# Patient Record
(demographics unavailable — no encounter records)

---

## 2025-03-12 NOTE — CONSULT LETTER
[Dear  ___] : Dear  [unfilled], [Consult Letter:] : I had the pleasure of evaluating your patient, [unfilled]. [Please see my note below.] : Please see my note below. [Consult Closing:] : Thank you very much for allowing me to participate in the care of this patient.  If you have any questions, please do not hesitate to contact me. [Sincerely,] : Sincerely, [FreeTextEntry3] : Ary Espinoza MD Attending Physician, Pediatric Gastroenterology and Nutrition [facility]  of Pediatrics [facility] at [address], [place] [address] [phone] fax: [phone]

## 2025-03-12 NOTE — CONSULT LETTER
[Dear  ___] : Dear  [unfilled], [Consult Letter:] : I had the pleasure of evaluating your patient, [unfilled]. [Please see my note below.] : Please see my note below. [Consult Closing:] : Thank you very much for allowing me to participate in the care of this patient.  If you have any questions, please do not hesitate to contact me. [Sincerely,] : Sincerely, [FreeTextEntry3] : Ary Espinoza MD Attending Physician, Pediatric Gastroenterology and Nutrition [facility]ju [facility]  of Pediatrics [facility] at [address], [place] [address] [phone] fax: [phone]

## 2025-03-12 NOTE — FAMILY HISTORY
[Noncontributory] : The patient's family history was noncontributory to the condition being treated. [Inflammatory Bowel Disease] : no inflammatory bowel disease [Celiac Disease] : no celiac disease [de-identified] : mother and father are s/p treatment many years ago for HP. 8yo brother has autism. 3 yo sister is healthy

## 2025-03-12 NOTE — ASSESSMENT
[Educated Patient & Family about Diagnosis] : educated the patient and family about the diagnosis [FreeTextEntry1] : Thriving 10 yo with ASD/anxiety with abdominal pain and positive Fecal HP. Both parents born in [facility] and s/p treatment for HP years ago. Exposure to multiple family members also from [facility]. HP gastritis on EGD 1/27/25. S/p Amox/Levo/PPI and without complaints of pain.  Plan: 1) Urease BT 2) Further management pending results

## 2025-03-12 NOTE — END OF VISIT
[Time Spent: ___ minutes] : I have spent [unfilled] minutes of time on the encounter which excludes teaching and separately reported services. [FreeTextEntry3] : I,  personally performed the evaluation and management (E/M) services for this patient who presents today. That E/M includes conducting the clinically appropriate interval history &/or exam, assessing all new/exacerbated conditions, and establishing a plan of care. Included in the visit timing was prep time with review of records and pertinent labs, progress notes, and consultations. Discussion time with family and patient re: illness, and management plan. As well as, post-visit completion of charting, consultation, and review on the day of the visit

## 2025-03-12 NOTE — HISTORY OF PRESENT ILLNESS
[de-identified] : Radha is a 10 yo with ASD/ anxiety referred by Dr. Arrieta for the evaluation and management of abdominal pain and positive fecal HP. She was first/last seen 12/11/24  The pain began a few months prior to her first visit. It was periumbilical, had initially occurred sporadically, but now occurred almost daily, and was non-radiating. It occurred at random, without any specific dietary precipitant noted. She had been avoiding "acidic foods". It had been relieved with time. It varies in intensity, but is usually mild, lasted a variable amount of time, and does not occur nocturnally.   She had been defecating daily and the stool was described as mushy ([place] #5) without associated rectal bleeding reported.   Parents born in [facility] and both treated a few years ago for HP gastritis. Exposed to multiple relatives all born in [facility].  Denied fever, rash, canker sores, arthritis, chest pain, heartburn, diarrhea, nausea, vomiting, weight loss and loss of appetite. She had been growing well. Was taking fish oil and magnesium. CBC, CMP, TSH URM  EGD 1/27/25: c/w HP gastritis. Prescribed amox, levo and PPI  Here now for follow-up 3/11/25: Feeling completely better. No pain, nausea. No vomiting. Good appetite and weight gain.

## 2025-03-12 NOTE — HISTORY OF PRESENT ILLNESS
[de-identified] : Radha is a 10 yo with ASD/ anxiety referred by Dr. Arrieta for the evaluation and management of abdominal pain and positive fecal HP. She was first/last seen 12/11/24  The pain began a few months prior to her first visit. It was periumbilical, had initially occurred sporadically, but now occurred almost daily, and was non-radiating. It occurred at random, without any specific dietary precipitant noted. She had been avoiding "acidic foods". It had been relieved with time. It varies in intensity, but is usually mild, lasted a variable amount of time, and does not occur nocturnally.   She had been defecating daily and the stool was described as mushy ([place] #5) without associated rectal bleeding reported.   Parents born in [facility] and both treated a few years ago for HP gastritis. Exposed to multiple relatives all born in [facility].  Denied fever, rash, canker sores, arthritis, chest pain, heartburn, diarrhea, nausea, vomiting, weight loss and loss of appetite. She had been growing well. Was taking fish oil and magnesium. CBC, CMP, TSH URM  EGD 1/27/25: c/w HP gastritis. Prescribed amox, levo and PPI  Here now for follow-up 3/11/25: Feeling completely better. No pain, nausea. No vomiting. Good appetite and weight gain.

## 2025-03-12 NOTE — ASSESSMENT
[Educated Patient & Family about Diagnosis] : educated the patient and family about the diagnosis [FreeTextEntry1] : Thriving 10 yo with ASD/anxiety with abdominal pain and positive Fecal HP. Both parents born in [facility] and s/p treatment for HP years ago. Exposure to multiple family members also from [facility]. HP gastritis on EGD 1/27/25. S/p Amox/Levo/PPI and without complaints of pain.  Plan: 1) Urease BT 2) Further management pending results

## 2025-03-12 NOTE — FAMILY HISTORY
[Noncontributory] : The patient's family history was noncontributory to the condition being treated. [Inflammatory Bowel Disease] : no inflammatory bowel disease [Celiac Disease] : no celiac disease [de-identified] : mother and father are s/p treatment many years ago for HP. 10yo brother has autism. 3 yo sister is healthy